# Patient Record
Sex: FEMALE | Race: WHITE | Employment: FULL TIME | URBAN - METROPOLITAN AREA
[De-identification: names, ages, dates, MRNs, and addresses within clinical notes are randomized per-mention and may not be internally consistent; named-entity substitution may affect disease eponyms.]

---

## 2019-07-17 ENCOUNTER — APPOINTMENT (OUTPATIENT)
Dept: CT IMAGING | Facility: CLINIC | Age: 49
DRG: 175 | End: 2019-07-17
Attending: EMERGENCY MEDICINE
Payer: COMMERCIAL

## 2019-07-17 ENCOUNTER — HOSPITAL ENCOUNTER (INPATIENT)
Facility: CLINIC | Age: 49
LOS: 1 days | Discharge: HOME | DRG: 175 | End: 2019-07-19
Attending: FAMILY MEDICINE | Admitting: FAMILY MEDICINE
Payer: COMMERCIAL

## 2019-07-17 ENCOUNTER — APPOINTMENT (OUTPATIENT)
Dept: GENERAL RADIOLOGY | Facility: CLINIC | Age: 49
DRG: 175 | End: 2019-07-17
Attending: EMERGENCY MEDICINE
Payer: COMMERCIAL

## 2019-07-17 DIAGNOSIS — R79.89 ELEVATED BRAIN NATRIURETIC PEPTIDE (BNP) LEVEL: ICD-10-CM

## 2019-07-17 DIAGNOSIS — I26.09 OTHER ACUTE PULMONARY EMBOLISM WITH ACUTE COR PULMONALE (H): Primary | ICD-10-CM

## 2019-07-17 DIAGNOSIS — R79.89 ELEVATED TROPONIN: ICD-10-CM

## 2019-07-17 DIAGNOSIS — I26.99 BILATERAL PULMONARY EMBOLISM (H): ICD-10-CM

## 2019-07-17 DIAGNOSIS — R06.09 DOE (DYSPNEA ON EXERTION): ICD-10-CM

## 2019-07-17 LAB
ALBUMIN SERPL-MCNC: 4 G/DL (ref 3.4–5)
ALP SERPL-CCNC: 146 U/L (ref 40–150)
ALT SERPL W P-5'-P-CCNC: 75 U/L (ref 0–50)
ANION GAP SERPL CALCULATED.3IONS-SCNC: 8 MMOL/L (ref 3–14)
AST SERPL W P-5'-P-CCNC: 123 U/L (ref 0–45)
BASOPHILS # BLD AUTO: 0.1 10E9/L (ref 0–0.2)
BASOPHILS NFR BLD AUTO: 0.9 %
BILIRUB SERPL-MCNC: 0.3 MG/DL (ref 0.2–1.3)
BUN SERPL-MCNC: 20 MG/DL (ref 7–30)
CALCIUM SERPL-MCNC: 9.6 MG/DL (ref 8.5–10.1)
CHLORIDE SERPL-SCNC: 108 MMOL/L (ref 94–109)
CO2 SERPL-SCNC: 24 MMOL/L (ref 20–32)
CREAT SERPL-MCNC: 0.76 MG/DL (ref 0.52–1.04)
D DIMER PPP FEU-MCNC: 3.4 UG/ML FEU (ref 0–0.5)
DIFFERENTIAL METHOD BLD: ABNORMAL
EOSINOPHIL # BLD AUTO: 0.1 10E9/L (ref 0–0.7)
EOSINOPHIL NFR BLD AUTO: 1.1 %
ERYTHROCYTE [DISTWIDTH] IN BLOOD BY AUTOMATED COUNT: 13.6 % (ref 10–15)
GFR SERPL CREATININE-BSD FRML MDRD: >90 ML/MIN/{1.73_M2}
GLUCOSE SERPL-MCNC: 106 MG/DL (ref 70–99)
HCT VFR BLD AUTO: 43.9 % (ref 35–47)
HGB BLD-MCNC: 14.2 G/DL (ref 11.7–15.7)
IMM GRANULOCYTES # BLD: 0 10E9/L (ref 0–0.4)
IMM GRANULOCYTES NFR BLD: 0.3 %
INR PPP: 1.02 (ref 0.86–1.14)
LYMPHOCYTES # BLD AUTO: 3.7 10E9/L (ref 0.8–5.3)
LYMPHOCYTES NFR BLD AUTO: 28.5 %
MCH RBC QN AUTO: 30.9 PG (ref 26.5–33)
MCHC RBC AUTO-ENTMCNC: 32.3 G/DL (ref 31.5–36.5)
MCV RBC AUTO: 95 FL (ref 78–100)
MONOCYTES # BLD AUTO: 0.8 10E9/L (ref 0–1.3)
MONOCYTES NFR BLD AUTO: 6.5 %
NEUTROPHILS # BLD AUTO: 8 10E9/L (ref 1.6–8.3)
NEUTROPHILS NFR BLD AUTO: 62.7 %
NRBC # BLD AUTO: 0 10*3/UL
NRBC BLD AUTO-RTO: 0 /100
NT-PROBNP SERPL-MCNC: 5749 PG/ML (ref 0–450)
PLATELET # BLD AUTO: 280 10E9/L (ref 150–450)
POTASSIUM SERPL-SCNC: 4.7 MMOL/L (ref 3.4–5.3)
PROT SERPL-MCNC: 8.2 G/DL (ref 6.8–8.8)
RADIOLOGIST FLAGS: ABNORMAL
RBC # BLD AUTO: 4.6 10E12/L (ref 3.8–5.2)
SODIUM SERPL-SCNC: 139 MMOL/L (ref 133–144)
TROPONIN I SERPL-MCNC: 0.08 UG/L (ref 0–0.04)
WBC # BLD AUTO: 12.8 10E9/L (ref 4–11)

## 2019-07-17 PROCEDURE — 99285 EMERGENCY DEPT VISIT HI MDM: CPT | Mod: 25 | Performed by: FAMILY MEDICINE

## 2019-07-17 PROCEDURE — 25000128 H RX IP 250 OP 636: Performed by: EMERGENCY MEDICINE

## 2019-07-17 PROCEDURE — 96375 TX/PRO/DX INJ NEW DRUG ADDON: CPT | Performed by: FAMILY MEDICINE

## 2019-07-17 PROCEDURE — 71046 X-RAY EXAM CHEST 2 VIEWS: CPT

## 2019-07-17 PROCEDURE — 25000128 H RX IP 250 OP 636: Performed by: FAMILY MEDICINE

## 2019-07-17 PROCEDURE — 85025 COMPLETE CBC W/AUTO DIFF WBC: CPT | Performed by: EMERGENCY MEDICINE

## 2019-07-17 PROCEDURE — 85610 PROTHROMBIN TIME: CPT | Performed by: EMERGENCY MEDICINE

## 2019-07-17 PROCEDURE — 80053 COMPREHEN METABOLIC PANEL: CPT | Performed by: EMERGENCY MEDICINE

## 2019-07-17 PROCEDURE — 93010 ELECTROCARDIOGRAM REPORT: CPT | Mod: Z6 | Performed by: FAMILY MEDICINE

## 2019-07-17 PROCEDURE — 84484 ASSAY OF TROPONIN QUANT: CPT | Performed by: EMERGENCY MEDICINE

## 2019-07-17 PROCEDURE — 93005 ELECTROCARDIOGRAM TRACING: CPT | Performed by: FAMILY MEDICINE

## 2019-07-17 PROCEDURE — 83880 ASSAY OF NATRIURETIC PEPTIDE: CPT | Performed by: EMERGENCY MEDICINE

## 2019-07-17 PROCEDURE — 99221 1ST HOSP IP/OBS SF/LOW 40: CPT | Mod: 25 | Performed by: INTERNAL MEDICINE

## 2019-07-17 PROCEDURE — 96366 THER/PROPH/DIAG IV INF ADDON: CPT | Performed by: FAMILY MEDICINE

## 2019-07-17 PROCEDURE — 96365 THER/PROPH/DIAG IV INF INIT: CPT | Performed by: FAMILY MEDICINE

## 2019-07-17 PROCEDURE — 85379 FIBRIN DEGRADATION QUANT: CPT | Performed by: EMERGENCY MEDICINE

## 2019-07-17 PROCEDURE — 71260 CT THORAX DX C+: CPT

## 2019-07-17 RX ORDER — IOPAMIDOL 755 MG/ML
53 INJECTION, SOLUTION INTRAVASCULAR ONCE
Status: COMPLETED | OUTPATIENT
Start: 2019-07-17 | End: 2019-07-17

## 2019-07-17 RX ORDER — NAPROXEN 250 MG/1
250 TABLET ORAL 2 TIMES DAILY WITH MEALS
Status: ON HOLD | COMMUNITY
End: 2019-07-18

## 2019-07-17 RX ORDER — HEPARIN SODIUM 10000 [USP'U]/100ML
0-3500 INJECTION, SOLUTION INTRAVENOUS CONTINUOUS
Status: DISCONTINUED | OUTPATIENT
Start: 2019-07-17 | End: 2019-07-19

## 2019-07-17 RX ORDER — OMEPRAZOLE 20 MG/1
40 TABLET, DELAYED RELEASE ORAL DAILY
Status: ON HOLD | COMMUNITY
End: 2019-07-18

## 2019-07-17 RX ORDER — CALCIUM/MAGNESIUM/ZINC 333-133 MG
175 TABLET ORAL
Status: ON HOLD | COMMUNITY
End: 2019-07-18

## 2019-07-17 RX ORDER — LEVOTHYROXINE SODIUM 75 UG/1
75 TABLET ORAL DAILY
COMMUNITY

## 2019-07-17 RX ORDER — PYRIDOXINE HCL (VITAMIN B6) 25 MG
25 TABLET ORAL DAILY
COMMUNITY

## 2019-07-17 RX ADMIN — HEPARIN SODIUM 1100 UNITS/HR: 10000 INJECTION, SOLUTION INTRAVENOUS at 23:15

## 2019-07-17 RX ADMIN — IOPAMIDOL 53 ML: 755 INJECTION, SOLUTION INTRAVENOUS at 21:20

## 2019-07-17 ASSESSMENT — ENCOUNTER SYMPTOMS
DIFFICULTY URINATING: 0
SHORTNESS OF BREATH: 1
ARTHRALGIAS: 0
EYE REDNESS: 0
WHEEZING: 0
CONFUSION: 0
FEVER: 0
HEADACHES: 0
COLOR CHANGE: 0
NECK STIFFNESS: 0
ABDOMINAL PAIN: 0

## 2019-07-17 ASSESSMENT — MIFFLIN-ST. JEOR: SCORE: 1200.14

## 2019-07-18 ENCOUNTER — APPOINTMENT (OUTPATIENT)
Dept: ULTRASOUND IMAGING | Facility: CLINIC | Age: 49
DRG: 175 | End: 2019-07-18
Attending: FAMILY MEDICINE
Payer: COMMERCIAL

## 2019-07-18 ENCOUNTER — APPOINTMENT (OUTPATIENT)
Dept: CARDIOLOGY | Facility: CLINIC | Age: 49
DRG: 175 | End: 2019-07-18
Payer: COMMERCIAL

## 2019-07-18 PROBLEM — Z85.71 HISTORY OF HODGKIN'S LYMPHOMA: Status: ACTIVE | Noted: 2019-07-18

## 2019-07-18 PROBLEM — K21.00 GASTROESOPHAGEAL REFLUX DISEASE WITH ESOPHAGITIS: Status: ACTIVE | Noted: 2019-07-18

## 2019-07-18 PROBLEM — E03.9 HYPOTHYROIDISM: Status: ACTIVE | Noted: 2019-07-18

## 2019-07-18 PROBLEM — I26.99 PULMONARY EMBOLISM (H): Status: ACTIVE | Noted: 2019-07-18

## 2019-07-18 PROBLEM — K21.9 GASTROESOPHAGEAL REFLUX DISEASE WITHOUT ESOPHAGITIS: Status: ACTIVE | Noted: 2019-07-18

## 2019-07-18 LAB
ALBUMIN SERPL-MCNC: 3.5 G/DL (ref 3.4–5)
ALP SERPL-CCNC: 140 U/L (ref 40–150)
ALT SERPL W P-5'-P-CCNC: 73 U/L (ref 0–50)
ANION GAP SERPL CALCULATED.3IONS-SCNC: 8 MMOL/L (ref 3–14)
AST SERPL W P-5'-P-CCNC: 100 U/L (ref 0–45)
BASOPHILS # BLD AUTO: 0.1 10E9/L (ref 0–0.2)
BASOPHILS NFR BLD AUTO: 1.6 %
BILIRUB SERPL-MCNC: 0.4 MG/DL (ref 0.2–1.3)
BUN SERPL-MCNC: 17 MG/DL (ref 7–30)
CALCIUM SERPL-MCNC: 8.8 MG/DL (ref 8.5–10.1)
CHLORIDE SERPL-SCNC: 109 MMOL/L (ref 94–109)
CO2 SERPL-SCNC: 21 MMOL/L (ref 20–32)
CREAT SERPL-MCNC: 0.74 MG/DL (ref 0.52–1.04)
DIFFERENTIAL METHOD BLD: NORMAL
EOSINOPHIL # BLD AUTO: 0.1 10E9/L (ref 0–0.7)
EOSINOPHIL NFR BLD AUTO: 1.3 %
ERYTHROCYTE [DISTWIDTH] IN BLOOD BY AUTOMATED COUNT: 13.7 % (ref 10–15)
GFR SERPL CREATININE-BSD FRML MDRD: >90 ML/MIN/{1.73_M2}
GLUCOSE SERPL-MCNC: 100 MG/DL (ref 70–99)
HCT VFR BLD AUTO: 40.1 % (ref 35–47)
HGB BLD-MCNC: 12.8 G/DL (ref 11.7–15.7)
IMM GRANULOCYTES # BLD: 0 10E9/L (ref 0–0.4)
IMM GRANULOCYTES NFR BLD: 0.4 %
INTERPRETATION ECG - MUSE: NORMAL
INTERPRETATION ECG - MUSE: NORMAL
LACTATE BLD-SCNC: 1 MMOL/L (ref 0.7–2)
LACTATE BLD-SCNC: NORMAL MMOL/L (ref 0.7–2)
LMWH PPP CHRO-ACNC: 0.75 IU/ML
LMWH PPP CHRO-ACNC: 0.91 IU/ML
LMWH PPP CHRO-ACNC: 1.44 IU/ML
LYMPHOCYTES # BLD AUTO: 1.7 10E9/L (ref 0.8–5.3)
LYMPHOCYTES NFR BLD AUTO: 19 %
MCH RBC QN AUTO: 30.5 PG (ref 26.5–33)
MCHC RBC AUTO-ENTMCNC: 31.9 G/DL (ref 31.5–36.5)
MCV RBC AUTO: 96 FL (ref 78–100)
MONOCYTES # BLD AUTO: 0.6 10E9/L (ref 0–1.3)
MONOCYTES NFR BLD AUTO: 6.6 %
NEUTROPHILS # BLD AUTO: 6.3 10E9/L (ref 1.6–8.3)
NEUTROPHILS NFR BLD AUTO: 71.1 %
NRBC # BLD AUTO: 0 10*3/UL
NRBC BLD AUTO-RTO: 0 /100
PLATELET # BLD AUTO: 274 10E9/L (ref 150–450)
POTASSIUM SERPL-SCNC: 4.2 MMOL/L (ref 3.4–5.3)
PROT SERPL-MCNC: 7 G/DL (ref 6.8–8.8)
RBC # BLD AUTO: 4.19 10E12/L (ref 3.8–5.2)
SODIUM SERPL-SCNC: 139 MMOL/L (ref 133–144)
TROPONIN I SERPL-MCNC: 0.07 UG/L (ref 0–0.04)
WBC # BLD AUTO: 8.9 10E9/L (ref 4–11)

## 2019-07-18 PROCEDURE — 84484 ASSAY OF TROPONIN QUANT: CPT | Performed by: FAMILY MEDICINE

## 2019-07-18 PROCEDURE — 93970 EXTREMITY STUDY: CPT

## 2019-07-18 PROCEDURE — 93308 TTE F-UP OR LMTD: CPT | Mod: 26 | Performed by: INTERNAL MEDICINE

## 2019-07-18 PROCEDURE — 93010 ELECTROCARDIOGRAM REPORT: CPT | Performed by: INTERNAL MEDICINE

## 2019-07-18 PROCEDURE — 93308 TTE F-UP OR LMTD: CPT

## 2019-07-18 PROCEDURE — 36415 COLL VENOUS BLD VENIPUNCTURE: CPT | Performed by: FAMILY MEDICINE

## 2019-07-18 PROCEDURE — 25000132 ZZH RX MED GY IP 250 OP 250 PS 637: Performed by: FAMILY MEDICINE

## 2019-07-18 PROCEDURE — 93325 DOPPLER ECHO COLOR FLOW MAPG: CPT | Mod: 26 | Performed by: INTERNAL MEDICINE

## 2019-07-18 PROCEDURE — 93321 DOPPLER ECHO F-UP/LMTD STD: CPT | Mod: 26 | Performed by: INTERNAL MEDICINE

## 2019-07-18 PROCEDURE — 80053 COMPREHEN METABOLIC PANEL: CPT | Performed by: STUDENT IN AN ORGANIZED HEALTH CARE EDUCATION/TRAINING PROGRAM

## 2019-07-18 PROCEDURE — 12000001 ZZH R&B MED SURG/OB UMMC

## 2019-07-18 PROCEDURE — 85520 HEPARIN ASSAY: CPT | Performed by: FAMILY MEDICINE

## 2019-07-18 PROCEDURE — 80053 COMPREHEN METABOLIC PANEL: CPT | Performed by: FAMILY MEDICINE

## 2019-07-18 PROCEDURE — 83605 ASSAY OF LACTIC ACID: CPT

## 2019-07-18 PROCEDURE — 25000128 H RX IP 250 OP 636: Performed by: FAMILY MEDICINE

## 2019-07-18 PROCEDURE — 85025 COMPLETE CBC W/AUTO DIFF WBC: CPT | Performed by: STUDENT IN AN ORGANIZED HEALTH CARE EDUCATION/TRAINING PROGRAM

## 2019-07-18 RX ORDER — LIDOCAINE 40 MG/G
CREAM TOPICAL
Status: DISCONTINUED | OUTPATIENT
Start: 2019-07-18 | End: 2019-07-19 | Stop reason: HOSPADM

## 2019-07-18 RX ORDER — NALOXONE HYDROCHLORIDE 0.4 MG/ML
.1-.4 INJECTION, SOLUTION INTRAMUSCULAR; INTRAVENOUS; SUBCUTANEOUS
Status: DISCONTINUED | OUTPATIENT
Start: 2019-07-18 | End: 2019-07-19 | Stop reason: HOSPADM

## 2019-07-18 RX ORDER — ONDANSETRON 2 MG/ML
4 INJECTION INTRAMUSCULAR; INTRAVENOUS EVERY 6 HOURS PRN
Status: DISCONTINUED | OUTPATIENT
Start: 2019-07-18 | End: 2019-07-19 | Stop reason: HOSPADM

## 2019-07-18 RX ORDER — ONDANSETRON 4 MG/1
4 TABLET, ORALLY DISINTEGRATING ORAL EVERY 6 HOURS PRN
Status: DISCONTINUED | OUTPATIENT
Start: 2019-07-18 | End: 2019-07-19 | Stop reason: HOSPADM

## 2019-07-18 RX ORDER — NAPROXEN 250 MG/1
250 TABLET ORAL DAILY
Status: ON HOLD | COMMUNITY
End: 2019-07-19

## 2019-07-18 RX ORDER — ACETAMINOPHEN 325 MG/1
650 TABLET ORAL EVERY 4 HOURS PRN
Status: DISCONTINUED | OUTPATIENT
Start: 2019-07-18 | End: 2019-07-19 | Stop reason: HOSPADM

## 2019-07-18 RX ORDER — CALCIUM/MAGNESIUM/ZINC 333-133 MG
TABLET ORAL DAILY
Status: ON HOLD | COMMUNITY
End: 2019-07-19

## 2019-07-18 RX ORDER — OMEPRAZOLE 40 MG/1
40 CAPSULE, DELAYED RELEASE ORAL DAILY
COMMUNITY

## 2019-07-18 RX ADMIN — LEVOTHYROXINE SODIUM 75 MCG: 0.05 TABLET ORAL at 08:21

## 2019-07-18 RX ADMIN — HEPARIN SODIUM 750 UNITS/HR: 10000 INJECTION, SOLUTION INTRAVENOUS at 22:22

## 2019-07-18 RX ADMIN — OMEPRAZOLE 40 MG: 20 CAPSULE, DELAYED RELEASE ORAL at 08:21

## 2019-07-18 RX ADMIN — HEPARIN SODIUM 850 UNITS/HR: 10000 INJECTION, SOLUTION INTRAVENOUS at 14:34

## 2019-07-18 ASSESSMENT — ENCOUNTER SYMPTOMS
BRUISES/BLEEDS EASILY: 0
NAUSEA: 0
DECREASED CONCENTRATION: 0
COUGH: 0
WEAKNESS: 0
RHINORRHEA: 1
FLANK PAIN: 0
PALPITATIONS: 0
VOMITING: 0
ACTIVITY CHANGE: 1
TROUBLE SWALLOWING: 0
FATIGUE: 1
BACK PAIN: 0
LIGHT-HEADEDNESS: 0
DYSPHORIC MOOD: 0

## 2019-07-18 ASSESSMENT — ACTIVITIES OF DAILY LIVING (ADL)
ADLS_ACUITY_SCORE: 13

## 2019-07-18 ASSESSMENT — MIFFLIN-ST. JEOR: SCORE: 1193.33

## 2019-07-18 NOTE — DISCHARGE SUMMARY
[facility], [place]  Discharge Summary - Medicine & Pediatrics       Date of Admission:  7/17/2019  Date of Discharge:  7/19/2019  Discharging Provider: Dr. Mir  Discharge Service: [facility]    Discharge Diagnoses   # Acute PE with acute cor pulmonale  # New Left BBB  # Hypothyroidism  # GERD  # Marcum's esophagus confirmed with endoscopy  # Hx of Hodgkin Lymphoma stage IIIa in remission s/p radiation    Follow-ups Needed After Discharge   Follow-up Appointments     Adult [facility] Follow-up and recommended labs and tests      Follow up with primary care provider, Misti Root, within 7 days for   hospital follow- up.  Consider coagulopathy testing, mammogram, EGD, early   cancer screening including colonoscopy, possible thyroid ultrasound. No   follow up labs or test are needed.    Follow up with cardiology, at [facility], within 1 month.     Appointments on [place] and/or [facility] (with [facility] or [facility]   provider or service). Call [phone] if you haven't heard regarding   these appointments within 7 days of discharge.          PCP  - Please ensure patient takes apixaban for 3 months [discharged with 1 month supply]  - Encourage patient to follow up with outpatient cardiology within 1 months  - Consider hypercoagulable work up and early cancer screening  - Remind patient to avoid NSAIDs    Unresulted Labs Ordered in the Past 30 Days of this Admission     No orders found from 6/17/2019 to 7/18/2019.      These results will be followed up by Dr. Mir    Discharge Disposition   Discharged to home  Condition at discharge: Good    Hospital Course   Puja Markham is a 48 year old female admitted on 7/17/2019. She has a history of Hodgkin Lymphoma s/p radiation at age 15, Cristopher's esophagus, GERD, & hypothyroidism and is admitted for pulmonary embolism. The following problems were addressed during her hospitalization:    # Acute PE with acute cor pulmonale  # New Left BBB  Patient with  several days of dyspnea with exertion in setting of recent long travel was found to have extensive bilateral pulmonary emboli on CT. Work up ruled out ACS, however, demonstrated new left BBB on EKG, elevated BNP and echo on 7/18 showed (1) RV mild-moderate dilation, (2) RV mild reduced function, (3) moderate PH, (4) elevated right side pressure, and (5) moderate AI. Negative LE Doppler. On admission hemodynamically stable but requiring 2L of O2; no intervention after being evaluated by cardiology and IR. Patient weaned off oxygen, vitally stable, & clinically improved at discharge. Managed with telemetry observation, heparin drip then transitioned to PO apixaban. Patient has no prior PE/DVT, no known familial coagulopathy, potential estrogen analog supplement use, and recent travel history, thus suspect provoked PE.  - Continue PO apixaban for 3 months [discharged with 1 month supply]  - Follow-up with outpatient cariology within 1 month; consider stress testing  - Discontinued Milk Thistle-Dand-Fennel-Licor 175mg PO due to estrogen analog  - Discontinued naproxen; use NSAID alternative like acetaminophen      # Hx of Hodgkin Lymphoma stage IIIa in remission since 1986 s/p radiation  Patient reports history of Hodgkin Lymphoma stage IIIa at age 15 that was treated with neck & abdominal radiation with splenectomy. Although PE likely provoked by stasis, malignancy & inherited coagulopathies worth considering.  - Outpatient cancer screening (mammogram [due 2019], EGD for Cristopher's [due 10/2019], colonoscopy [due 12/2019, however ASCRS recommends start age 45])  - Consider outpatient hypercoagulation work up after completing apixaban course     # GERD  # Marcum's esophagus confirmed with endoscopy  Managed with home omeprazole.  - Follow up EGD of Marcum's esophagus in 2019    # Hypothyroidism   Managed with home levothyroxine.    Consultations This Hospital Stay   CARDIOLOGY GENERAL ADULT IP CONSULT  MEDICATION  HISTORY IP PHARMACY CONSULT    Code Status   Full Code       The patient was discussed with Dr. Clarke Willett's Service  [facility] [place]  Pager: [phone]    I was present with the medical student who participated in the service and in the documentation of this note. I have verified the history and personally performed the physical exam and medical decision making, and have verified the content of the note, which accurately reflects my assessment of the patient and the plan of care.   Simran Gautam MD       ______________________________________________________________________    Physical Exam   Vital Signs: Temp: 98  F (36.7  C) Temp src: Oral BP: 122/65   Heart Rate: 90 Resp: 16 SpO2: 93 % O2 Device: None (Room air)  Weight: 132 lbs 1.6 oz  Constitutional: awake, alert, cooperative, no apparent distress  Respiratory: Breathing comfortably on room air, good air exchange, clear to auscultation bilaterally, no crackles or wheezing  Cardiovascular: RRR, normal S1 and S2, without any murmurs or extra heart sounds  GI: Soft, non-distended, non-tender, no masses palpated  Skin: no bruising or bleeding and no redness, warmth, or swelling  Musculoskeletal: no lower extremity pitting edema present; no tenderness of calves  Neuropsychiatric: General: normal, calm and normal eye contact  Level of consciousness: alert / normal  Affect: normal and pleasant        Primary Care Physician   Misti Root    Discharge Orders      Reason for your hospital stay    You were admitted for a blood clot in your lungs. You were treated with blood thinners and will keep taking them by mouth when you leave.     Adult [facility] Follow-up and recommended labs and tests    Follow up with primary care provider, Misti Root, within 7 days for hospital follow- up.  Consider coagulopathy testing, mammogram, EGD, early cancer screening including colonoscopy, possible thyroid ultrasound. No follow up  labs or test are needed.    Follow up with cardiology, at [facility], within 1 month.     Appointments on [place] and/or [facility] (with [facility] or [facility] provider or service). Call [phone] if you haven't heard regarding these appointments within 7 days of discharge.     Activity    Your activity upon discharge: activity as tolerated     Full Code     Diet    Follow this diet upon discharge: Regular       Significant Results and Procedures   Most Recent 3 CBC's:  Recent Labs   Lab Test 07/19/19  0450 07/18/19  1044 07/17/19 2033   WBC 9.2 8.9 12.8*   HGB 12.1 12.8 14.2   MCV 97 96 95    274 280     Most Recent 3 BMP's:  Recent Labs   Lab Test 07/19/19  0450 07/18/19  0604 07/17/19 2033    139 139   POTASSIUM 3.9 4.2 4.7   CHLORIDE 109 109 108   CO2 23 21 24   BUN 17 17 20   CR 0.70 0.74 0.76   ANIONGAP 8 8 8   JAMES 9.0 8.8 9.6   GLC 96 100* 106*     Most Recent INR's and Anticoagulation Dosing History:  Anticoagulation Dose History     Recent Dosing and Labs Latest Ref Rng & Units 7/17/2019    INR 0.86 - 1.14 1.02        Most Recent 3 Troponin's:  Recent Labs   Lab Test 07/18/19  0604 07/17/19 2033   TROPI 0.070* 0.082*     Most Recent 3 BNP's:  Recent Labs   Lab Test 07/17/19 2033   NTBNPI 5,749*     Most Recent D-dimer:  Recent Labs   Lab Test 07/17/19 2033   DD 3.4*     Most Recent TSH and T4:No lab results found.,   Results for orders placed or performed during the hospital encounter of 07/17/19   XR Chest 2 Views    Narrative    EXAM: XR CHEST 2 VW  7/17/2019 9:53 PM      HISTORY: shortness of breath    COMPARISON: CT chest 7/17/2019    FINDINGS: PA and lateral chest. The trachea is midline. The cardiac  silhouette is within normal limits. No pleural effusion or  pneumothorax. Upper abdomen is unremarkable. Degenerative changes of  the spine.       Impression    IMPRESSION: No acute airspace opacities.    I have personally reviewed the examination and initial interpretation  and I agree with  the findings.    FADUMO SANTOS MD   CT Chest Pulmonary Embolism w Contrast     Value    Radiologist flags Acute pulmonary emboli (Urgent)    Narrative    Examination:  CT CHEST PULMONARY EMBOLISM W CONTRAST 7/17/2019 9:31 PM      Comparison: None available.    PROVIDED HISTORY: PE suspected, high pretest probability    TECHNIQUE: Volumetric helical acquisition of CT images of the chest  from the lung apices to the kidneys were acquired in arterial phase  after the administration of IV contrast. Three-dimensional (3D)  post-processed angiographic images were reconstructed, archived to  PACS and used in the interpretation of this study. Contrast dose:  iopamidol (ISOVUE-370) solution 53 mL    FINDINGS:  VASCULATURE: There is adequate opacification of the main and lobar  pulmonary arteries. Acute pulmonary emboli in the distal right  pulmonary artery extending into the right upper, middle, and lower  lobar and segmental arteries, as well as the distal left main  pulmonary artery descending into the left upper and lower lobar and  segmental arteries. No right heart strain. Heart size is within normal  limits. Normal caliber of the thoracic aorta and main pulmonary  artery. No pericardial effusion.    LUNGS: The central tracheobronchial tree is patent. No pleural  effusion or pneumothorax. Right upper lobe paramediastinal  architectural distortion with slight bronchiectasis and reticulation  suggesting atelectasis. Mild dependent atelectasis bilaterally.  Vaguely triangular 1.2 x 1.4 cm nodular opacity in the left  costophrenic sulcus (series 9, image 81).    MEDIASTINUM: No enlarged mediastinal or axillary lymph nodes. Thyroid  is unremarkable. Mildly patulous esophagus.    UPPER ABDOMEN: Incidental low-density left renal cortical cyst.    BONES/SOFT TISSUES: No aggressive osseous lesions.      Impression    IMPRESSION:   1. Acute extensive pulmonary emboli extending the from the distal  right and left pulmonary  arteries into all the lobar arteries and  multiple segmental branches.  2. No CT evidence of right heart strain.  3. Subtly triangular nodule in the left costophrenic sulcus, possibly  pulmonary infarction. Consider short term follow-up.    [Urgent Result: Acute pulmonary emboli]    Finding was identified on 7/17/2019 9:55 PM.     Dr. Arevalo was contacted by Dr. Dubon at 7/17/2019 10:08 PM and  verbalized understanding of the urgent finding.     I have personally reviewed the examination and initial interpretation  and I agree with the findings.    FADUMO SANTOS MD   US Lower Extremity Venous Duplex Bilateral    Narrative    EXAMINATION: DOPPLER VENOUS ULTRASOUND OF BILATERAL LOWER EXTREMITIES,  7/18/2019 1:03 AM     COMPARISON: None available    HISTORY: Evaluation for DVT     TECHNIQUE:  Gray-scale evaluation with compression, spectral flow and  color Doppler assessment of the deep venous system of both legs from  groin to knee, and then at the ankles.    FINDINGS:  In both lower extremities, the common femoral, femoral, popliteal and  posterior tibial veins demonstrate normal compressibility and blood  flow.      Impression    IMPRESSION:  No evidence of deep venous thrombosis in either lower extremity.    I have personally reviewed the examination and initial interpretation  and I agree with the findings.    ILIANA ARAYA MD       Discharge Medications   Current Discharge Medication List      START taking these medications    Details   apixaban ANTICOAGULANT (ELIQUIS STARTER PACK) 5 MG tablet Take 2 tablets (10 mg) by mouth 2 times daily for 7 days, THEN 1 tablet (5 mg) 2 times daily for 23 days.  Qty: 74 tablet, Refills: 0    Associated Diagnoses: Other acute pulmonary embolism with acute cor pulmonale (H)         CONTINUE these medications which have NOT CHANGED    Details   levothyroxine (SYNTHROID/LEVOTHROID) 75 MCG tablet Take 75 mcg by mouth daily      omeprazole (PRILOSEC) 40 MG DR capsule Take  40 mg by mouth daily      vitamin B6 (VITAMIN  B-6) 25 MG tablet Take 25 mg by mouth daily         STOP taking these medications       Milk Thistle-Dand-Fennel-Licor (MILK THISTLE XTRA) CAPS capsule Comments:   Reason for Stopping:         Milk Thistle-Dand-Fennel-Licor (MILK THISTLE XTRA) CAPS capsule Comments:   Reason for Stopping:         naproxen (NAPROSYN) 250 MG tablet Comments:   Reason for Stopping:         naproxen (NAPROSYN) 250 MG tablet Comments:   Reason for Stopping:         omeprazole (PRILOSEC OTC) 20 MG EC tablet Comments:   Reason for Stopping:             Allergies   No Known Allergies       Attestation:  This patient has been seen and evaluated by me, Karol Mir on 7/19/2019.  I saw and discussed the case with the primary resident and the care team. I agree with the findings and plan in this note. I have reviewed today's vital signs, medications, laboratory results and imaging results, EKG, telemetry.   [facility]

## 2019-07-18 NOTE — ED PROVIDER NOTES
History     Chief Complaint   Patient presents with     Shortness of Breath     HPI  Puja Markham is a 48 year old female with a history of GERD and Hodgkin's lymphoma who presents to the Emergency Department today for evaluation of shortness of breath. The patient states that she has been having increased shortness of breath the past few days. The patient states that she has been having shortness of breath with activity. She notes that she does not have shortness of breath at rest. The patient denies having any chest pain, wheezing. The patient reports that she has had issues with shortness of breath in the past that she was told was secondary to her history of GERD. The patient notes that this shortness of breath is not similar.  Patient notes that she traveled to [place] 6 hours in a car over 4 July and then returned.  Denies any leg pain or leg swelling no family history of clotting disorder otherwise.  No fevers or chills no hemoptysis.  At rest she feels okay no history of syncope.    I have reviewed the Medications, Allergies, Past Medical and Surgical History, and Social History in the [employer] system.    Past Medical History:   Diagnosis Date     Hodgkin disease (H) 1986     Seizures (H)     d/t epidrual.      Past Surgical History:   Procedure Laterality Date     ENDOMETRIAL CRYOABLATION W/ ULTRASONIC GUIDANCE  2011     HYSTERECTOMY RADICAL  2017     LASIK  04/2017     History reviewed. No pertinent family history.    Social History     Tobacco Use     Smoking status: Current Some Day Smoker     Smokeless tobacco: Never Used     Tobacco comment: One pack in 1.5 months   Substance Use Topics     Alcohol use: Yes     Comment: on the weekends.      Current Facility-Administered Medications   Medication     heparin  drip 25,000 units in 0.45% NaCl 250 mL (see additional administration details for dose)     heparin bolus from infusion pump      No Known Allergies     Review of Systems   Constitutional:  "Positive for activity change and fatigue. Negative for fever.        Increasing shortness of breath with activity.   HENT: Positive for rhinorrhea. Negative for congestion, nosebleeds and trouble swallowing.    Eyes: Negative for redness and visual disturbance.   Respiratory: Positive for shortness of breath. Negative for cough and wheezing.    Cardiovascular: Negative for chest pain, palpitations and leg swelling.   Gastrointestinal: Negative for abdominal pain, nausea and vomiting.   Genitourinary: Negative for difficulty urinating and flank pain.   Musculoskeletal: Negative for arthralgias, back pain, gait problem and neck stiffness.   Skin: Negative for color change.   Allergic/Immunologic: Negative for immunocompromised state.   Neurological: Negative for syncope, weakness, light-headedness and headaches.   Hematological: Does not bruise/bleed easily.   Psychiatric/Behavioral: Negative for confusion, decreased concentration and dysphoric mood.   All other systems reviewed and are negative.    Physical Exam   BP: 154/83  Heart Rate: 100  Temp: 98.3  F (36.8  C)  Resp: 20  Height: 160 cm (5' 3\")  Weight: 60.6 kg (133 lb 9.6 oz)  SpO2: 94 %    Physical Exam   Constitutional: She is oriented to person, place, and time. She appears well-developed and well-nourished. She appears distressed.   In the ER patient mildly anxious but otherwise appropriate here.  Is not markedly dyspneic with speech here with her son.   HENT:   Head: Normocephalic and atraumatic.   Eyes: Pupils are equal, round, and reactive to light. Conjunctivae and EOM are normal. No scleral icterus.   Neck: Normal range of motion. Neck supple. No JVD present. No tracheal deviation present.   Cardiovascular:   Mild sinus tachycardia slightly over 100   Pulmonary/Chest: No stridor. No respiratory distress. She has no wheezes. She has no rales.   Abdominal: She exhibits no distension. There is no tenderness. There is no guarding.   Musculoskeletal: She " exhibits no edema, tenderness or deformity.   Negative Homans sign   Neurological: She is alert and oriented to person, place, and time.   Skin: Skin is warm and dry. Capillary refill takes less than 2 seconds. No rash noted. She is not diaphoretic. No erythema. No pallor.   Psychiatric:   Affect is appropriate here in the ER   Nursing note and vitals reviewed.      ED Course        Procedures         Patient quickly evaluated here in the ER.  Initially patient had an EKG done revealing sinus tachycardia.  Patient laboratory testing done.  White count was 12.8.  D-dimer noted to be elevated 3.4.  Troponin slightly elevated 0.082.  BNP was 5749.  ALT 75 AST is 123.  Creatinine noted to be 0.76.  Glucose 106.  INR 1.02.    CT scan done of the chest PE protocol revealed extensive pulmonary emboli throughout all lobar and segmental pulmonary arteries.  No visible signs of heart strain seen on CT there is some question of a left lower lung infarct also.    Discussed findings with the patient.  I consulted the PERT.  Discussed with her that radiology also cardiology fellow did come down and see the patient here in the ER.  Echo was done revealing no significant right heart strain at this point.  This was discussed currently with the cardiology staff who is on for PERT.  Recommendations at this point we initiated heparin right away here in the ER high intensity with bolus.  Patient on oxygen.  Will be admitted to medicine service I did talk to them also I did order bilateral lower extremity Dopplers also.  Discussed at length with patient regarding findings etc. she is stable.here in the ER and was then admitted to a medical telemetry bed.                 EKG Interpretation:      Interpreted by Earl Arevalo  Time reviewed: 2040  Symptoms at time of EKG: sob   Rhythm: sinus tach  Rate: normal  Axis: left  Ectopy: none  Conduction: nonspec ivcd  ST Segments/ T Waves: Nonspecific ST-T wave changes  Q Waves:  none  Comparison to prior: No old EKG available    Clinical Impression: normal EKG          Critical Care time:  was 30 minutes for this patient excluding procedures.             Labs Ordered and Resulted from Time of ED Arrival Up to the Time of Departure from the ED   CBC WITH PLATELETS DIFFERENTIAL - Abnormal; Notable for the following components:       Result Value    WBC 12.8 (*)     All other components within normal limits   D DIMER QUANTITATIVE - Abnormal; Notable for the following components:    D Dimer 3.4 (*)     All other components within normal limits   COMPREHENSIVE METABOLIC PANEL - Abnormal; Notable for the following components:    Glucose 106 (*)     ALT 75 (*)      (*)     All other components within normal limits   TROPONIN I - Abnormal; Notable for the following components:    Troponin I ES 0.082 (*)     All other components within normal limits   NT PROBNP INPATIENT - Abnormal; Notable for the following components:    N-Terminal Pro BNP Inpatient 5,749 (*)     All other components within normal limits   INR   NOTIFY PHYSICIAN   NOTIFY PHYSICIAN   PLATELETS MONITORED PER HEPARIN TREATMENT PROTOCOL (FOR MEANINGFUL USE     Results for orders placed or performed during the hospital encounter of 07/17/19   XR Chest 2 Views    Narrative    EXAM: XR CHEST 2 VW  7/17/2019 9:53 PM      HISTORY: shortness of breath    COMPARISON: CT chest 7/17/2019    FINDINGS: PA and lateral chest. The trachea is midline. The cardiac  silhouette is within normal limits. No pleural effusion or  pneumothorax. Upper abdomen is unremarkable. Degenerative changes of  the spine.       Impression    IMPRESSION: No acute airspace opacities.    I have personally reviewed the examination and initial interpretation  and I agree with the findings.    FADUMO SANTOS MD   CT Chest Pulmonary Embolism w Contrast   Result Value Ref Range    Radiologist flags Acute pulmonary emboli (Urgent)     Narrative    Examination:  CT  CHEST PULMONARY EMBOLISM W CONTRAST 7/17/2019 9:31 PM      Comparison: None available.    PROVIDED HISTORY: PE suspected, high pretest probability    TECHNIQUE: Volumetric helical acquisition of CT images of the chest  from the lung apices to the kidneys were acquired in arterial phase  after the administration of IV contrast. Three-dimensional (3D)  post-processed angiographic images were reconstructed, archived to  PACS and used in the interpretation of this study. Contrast dose:  iopamidol (ISOVUE-370) solution 53 mL    FINDINGS:  VASCULATURE: There is adequate opacification of the main and lobar  pulmonary arteries. Acute pulmonary emboli in the distal right  pulmonary artery extending into the right upper, middle, and lower  lobar and segmental arteries, as well as the distal left main  pulmonary artery descending into the left upper and lower lobar and  segmental arteries. No right heart strain. Heart size is within normal  limits. Normal caliber of the thoracic aorta and main pulmonary  artery. No pericardial effusion.    LUNGS: The central tracheobronchial tree is patent. No pleural  effusion or pneumothorax. Right upper lobe paramediastinal  architectural distortion with slight bronchiectasis and reticulation  suggesting atelectasis. Mild dependent atelectasis bilaterally.  Vaguely triangular 1.2 x 1.4 cm nodular opacity in the left  costophrenic sulcus (series 9, image 81).    MEDIASTINUM: No enlarged mediastinal or axillary lymph nodes. Thyroid  is unremarkable. Mildly patulous esophagus.    UPPER ABDOMEN: Incidental low-density left renal cortical cyst.    BONES/SOFT TISSUES: No aggressive osseous lesions.      Impression    IMPRESSION:   1. Acute extensive pulmonary emboli extending the from the distal  right and left pulmonary arteries into all the lobar arteries and  multiple segmental branches.  2. No CT evidence of right heart strain.  3. Subtly triangular nodule in the left costophrenic sulcus,  possibly  pulmonary infarction. Consider short term follow-up.    [Urgent Result: Acute pulmonary emboli]    Finding was identified on 7/17/2019 9:55 PM.     Dr. Arevalo was contacted by Dr. Dubon at 7/17/2019 10:08 PM and  verbalized understanding of the urgent finding.     I have personally reviewed the examination and initial interpretation  and I agree with the findings.    FADUMO SANTOS MD   US Lower Extremity Venous Duplex Bilateral    Narrative    EXAMINATION: DOPPLER VENOUS ULTRASOUND OF BILATERAL LOWER EXTREMITIES,  7/18/2019 1:03 AM     COMPARISON: None available    HISTORY: Evaluation for DVT     TECHNIQUE:  Gray-scale evaluation with compression, spectral flow and  color Doppler assessment of the deep venous system of both legs from  groin to knee, and then at the ankles.    FINDINGS:  In both lower extremities, the common femoral, femoral, popliteal and  posterior tibial veins demonstrate normal compressibility and blood  flow.      Impression    IMPRESSION:  No evidence of deep venous thrombosis in either lower extremity.   CBC with platelets differential   Result Value Ref Range    WBC 12.8 (H) 4.0 - 11.0 10e9/L    RBC Count 4.60 3.8 - 5.2 10e12/L    Hemoglobin 14.2 11.7 - 15.7 g/dL    Hematocrit 43.9 35.0 - 47.0 %    MCV 95 78 - 100 fl    MCH 30.9 26.5 - 33.0 pg    MCHC 32.3 31.5 - 36.5 g/dL    RDW 13.6 10.0 - 15.0 %    Platelet Count 280 150 - 450 10e9/L    Diff Method Automated Method     % Neutrophils 62.7 %    % Lymphocytes 28.5 %    % Monocytes 6.5 %    % Eosinophils 1.1 %    % Basophils 0.9 %    % Immature Granulocytes 0.3 %    Nucleated RBCs 0 0 /100    Absolute Neutrophil 8.0 1.6 - 8.3 10e9/L    Absolute Lymphocytes 3.7 0.8 - 5.3 10e9/L    Absolute Monocytes 0.8 0.0 - 1.3 10e9/L    Absolute Eosinophils 0.1 0.0 - 0.7 10e9/L    Absolute Basophils 0.1 0.0 - 0.2 10e9/L    Abs Immature Granulocytes 0.0 0 - 0.4 10e9/L    Absolute Nucleated RBC 0.0    D dimer quantitative   Result Value  Ref Range    D Dimer 3.4 (H) 0.0 - 0.50 ug/ml FEU   INR   Result Value Ref Range    INR 1.02 0.86 - 1.14   Comprehensive metabolic panel   Result Value Ref Range    Sodium 139 133 - 144 mmol/L    Potassium 4.7 3.4 - 5.3 mmol/L    Chloride 108 94 - 109 mmol/L    Carbon Dioxide 24 20 - 32 mmol/L    Anion Gap 8 3 - 14 mmol/L    Glucose 106 (H) 70 - 99 mg/dL    Urea Nitrogen 20 7 - 30 mg/dL    Creatinine 0.76 0.52 - 1.04 mg/dL    GFR Estimate >90 >60 mL/min/[1.73_m2]    GFR Estimate If Black >90 >60 mL/min/[1.73_m2]    Calcium 9.6 8.5 - 10.1 mg/dL    Bilirubin Total 0.3 0.2 - 1.3 mg/dL    Albumin 4.0 3.4 - 5.0 g/dL    Protein Total 8.2 6.8 - 8.8 g/dL    Alkaline Phosphatase 146 40 - 150 U/L    ALT 75 (H) 0 - 50 U/L     (H) 0 - 45 U/L   Troponin I   Result Value Ref Range    Troponin I ES 0.082 (H) 0.000 - 0.045 ug/L   Nt probnp inpatient (BNP)   Result Value Ref Range    N-Terminal Pro BNP Inpatient 5,749 (H) 0 - 450 pg/mL   EKG 12-lead, tracing only   Result Value Ref Range    Interpretation ECG Click View Image link to view waveform and result    Cardiology General Adult IP Consult: Patient to be seen: Routine within 24 hrs; Call back #: [id number]; PE; Consultant may enter orders: Yes; Requesting provider? Hospitalist (if different from attending physician)    Narrative    Abram Rodríguez MD     7/17/2019 11:47 PM  CARDIOLOGY CONSULTATION  [facility], [place]    Assessment & Plan   Puja Markham is a 48 year old female who presents with acute   pulmonary embolism.    #acute PE    Risk stratification:   Calculation of patient's Simplified Pulmonary Embolism Severity   Index (sPESI) score:1(history of cancer)  RV strain by RV:LV ratio: 1:1  RV enlargement and dysfunction by echo: Yes  LE Doppler findings: Pending  Biomarker data: Positive troponin 0.082  Vital signs: Heart rate 104, blood pressure 140/80, O2 sat 96% on   2 L cannula  Classification of PE (high-intermediate  submassive):       Given hemodynamic stability, we do not think we need invasive   intervention.  We agree with anticoagulation with heparin   followed by oral anticoagulation later.  We may need to follow-up echocardiogram to assess RV function.  Troponin he is likely from RV strain.  Please check troponin   serially as follow-up but it is unlikely ACS.      # moderate AI, severely calcified AoV likely from radiation   - needs outpatient cardiology echo follow up     # Hodgikin's lymphoma history at age of 15    The results and significance of today's testing was discussed   with the patient in lay terms. More the 50% of time was for   counseling and coordinating care.    Thank you for allowing us to participate in the care of your   patient. Please do not hesitate to contact the cardiology consult   service if you have any questions.     Cardiology will follow.    Patient discussed with Dr. Sakshi Rodríguez MD  Cardiology Fellow [phone]      History of Present Illness   Chief complaint:MARTA Markham is a 48 year old female with a history of GERD   and Hodgkin's lymphoma at age of 15 who presents to the Emergency   Department today for evaluation of shortness of breath. The   patient states that she has been having increased shortness of   breath the past few days.   Until this happened, she was physically very active.  She walks   45 minutes every day without short of breath.    No chest pain.  No wheezing.  No swelling in her legs.  No   bleeding or thrombosis history.  No family history of thrombosis   or bleeding.  She has 2 children.  Non-smoker.  No hormonal   treatment.  No history of miscarriage.    In the emergency department, CT chest showed pulmonary embolism.    PE rapid response team was activated.    CT  IMPRESSION:   1. Acute extensive pulmonary emboli extending the from the distal  right and left pulmonary arteries into all the lobar arteries and  multiple segmental branches.  2. No  CT evidence of right heart strain.  3. Subtly triangular nodule in the left costophrenic sulcus,   possibly  pulmonary infarction. Differential diagnosis includes round  atelectasis and aspiration.          EKG  Sinus tachycardia, LBBB, LAD    Echocardiogram:  Mild RV strain.  Mild LV dilation.  LV systolic function normal.  Mild TR.  TR pressure gradient was 42 mmHg.  Calcified aortic valve with moderate AI.  Dilated IVC.  Atrium was not evaluated well.        Past Medical History:   Diagnosis Date     Hodgkin disease (H) 1986     Seizures (H)     d/t epidrual.      Past Surgical History:   Procedure Laterality Date     ENDOMETRIAL CRYOABLATION W/ ULTRASONIC GUIDANCE  2011     HYSTERECTOMY RADICAL  2017     LASIK  04/2017     Social History     Socioeconomic History     Marital status:      Spouse name: Not on file     Number of children: Not on file     Years of education: Not on file     Highest education level: Not on file   Occupational History     Not on file   Social Needs     Financial resource strain: Not on file     Food insecurity:     Worry: Not on file     Inability: Not on file     Transportation needs:     Medical: Not on file     Non-medical: Not on file   Tobacco Use     Smoking status: Current Some Day Smoker     Smokeless tobacco: Never Used     Tobacco comment: One pack in 1.5 months   Substance and Sexual Activity     Alcohol use: Yes     Comment: on the weekends.      Drug use: Never     Sexual activity: Yes     Partners: Male   Lifestyle     Physical activity:     Days per week: Not on file     Minutes per session: Not on file     Stress: Not on file   Relationships     Social connections:     Talks on phone: Not on file     Gets together: Not on file     Attends [employer] service: Not on file     Active member of club or organization: Not on file     Attends meetings of clubs or organizations: Not on file     Relationship status: Not on file     Intimate partner violence:     Fear of  current or ex partner: Not on file     Emotionally abused: Not on file     Physically abused: Not on file     Forced sexual activity: Not on file   Other Topics Concern     Not on file   Social History Narrative     Not on file       History reviewed. No pertinent family history.  Non contributory  Prior to Admission Medications   Prior to Admission Medications   Prescriptions Last Dose Informant Patient Reported? Taking?   NELLY SEED PO 7/17/2019 at Unknown time  Yes Yes   Milk Thistle-Dand-Fennel-Licor (MILK THISTLE XTRA) CAPS capsule   7/17/2019 at Unknown time  Yes Yes   Sig: Take 175 mg by mouth   levothyroxine (SYNTHROID/LEVOTHROID) 75 MCG tablet 7/17/2019 at   Unknown time  Yes Yes   Sig: Take 75 mcg by mouth daily   magnesium sulfate (EPSOM SALT) GRAN granules Past Week at Unknown   time  Yes Yes   Sig: Apply topically 2 times daily as needed for skin care   naproxen (NAPROSYN) 250 MG tablet 7/17/2019 at Unknown time  Yes   Yes   Sig: Take 250 mg by mouth 2 times daily (with meals)   omeprazole (PRILOSEC OTC) 20 MG EC tablet 7/17/2019 at Unknown   time  Yes Yes   Sig: Take 40 mg by mouth daily   vitamin B6 (VITAMIN  B-6) 25 MG tablet 7/17/2019 at Unknown time    Yes Yes   Sig: Take 25 mg by mouth daily      Facility-Administered Medications: None       No current facility-administered medications on file prior to   encounter.   Current Outpatient Medications on File Prior to Encounter:  NELLY SEED PO    levothyroxine (SYNTHROID/LEVOTHROID) 75 MCG tablet Take 75 mcg by   mouth daily   magnesium sulfate (EPSOM SALT) GRAN granules Apply topically 2   times daily as needed for skin care   Milk Thistle-Dand-Fennel-Licor (MILK THISTLE XTRA) CAPS capsule   Take 175 mg by mouth   naproxen (NAPROSYN) 250 MG tablet Take 250 mg by mouth 2 times   daily (with meals)   omeprazole (PRILOSEC OTC) 20 MG EC tablet Take 40 mg by mouth   daily   vitamin B6 (VITAMIN  B-6) 25 MG tablet Take 25 mg by mouth daily     Allergies   No  Known Allergies  Review of Systems   10-point ROS reviewed & found negative w/ exceptions noted in the   HPI.    Physical Exam   Temp: 97.4  F (36.3  C) Temp src: Oral BP: 143/82   Heart Rate:   104 Resp: 20 SpO2: 96 % O2 Device: Nasal cannula Oxygen Delivery:   2 LPM    GEN:  Alert, oriented x 3, appears comfortable, NAD.  HEENT:  Normocephalic/atraumatic, no scleral icterus, no nasal   discharge, mouth moist.  CV:  Heart is with regular rate/rhythm. No murmurs,   No rubs. Normal S1 and S2.  No S3, No S4   Positive JVD with 10cm H2O in [place] venous pressure.  Jugular venus pressure is with decline in inspiration. No   Kussmaul's sign.  Peripheral arteries:RA, FA, DP, PT all 2+/2+  no bruit on carotid arteries bilaterally, normal uptake of the   both carotid arteries.  No bruit on abdomen or upper chest.  No lower extremities edema2+/2+  LUNGS:  Clear to auscultation bilaterally   ABD:  Active bowel sounds, soft, non-tender/non-distended.  No   rebound/guarding/rigidity.  EXT:  No edema or cyanosis.  Hands/feet warm to touch with good   signs of peripheral perfusion.   SKIN:  Dry to touch, no exanthems noted in the visualized areas.  NEURO:  No new focal deficits appreciated.    Imaging Study  Data              Labs  Data       Metabolic Studies  Recent Labs   Lab 07/17/19 2033      POTASSIUM 4.7   CHLORIDE 108   CO2 24   ANIONGAP 8   *   BUN 20   CR 0.76   GFRESTIMATED >90   GFRESTBLACK >90   JAMES 9.6   PROTTOTAL 8.2   ALBUMIN 4.0   BILITOTAL 0.3   ALKPHOS 146   *   ALT 75*     Hematology  Recent Labs   Lab 07/17/19 2033   WBC 12.8*   RBC 4.60   HGB 14.2   HCT 43.9   MCV 95   MCH 30.9   MCHC 32.3   RDW 13.6        INR  Recent Labs   Lab 07/17/19 2033   INR 1.02       Troponin  Recent Labs   Lab 07/17/19 2033   TROPI 0.082*              Physical Exam     Abram Rodríguez MD  Cardiology Fellow [phone]                          Assessments & Plan (with Medical Decision Making)  48-year-old  female history of Hodgkin's lipoma many years ago in remission presents now with 5 days of increasing dyspnea on exertion.  Patient evaluated here in the ER d-dimer was elevated to 3.4.  CT scan shows diffuse bilateral pulmonary emboli seen throughout the lobar and segmental pulmonary arteries.  Questionable left lower infarct seen.  No septal changes seen on CT.  Activation of the PERT.  Patient seen by cards fellow down here in the ER reviewed echo showing some questionable mild right heart strain but no sign of any decompensation etc.  Patient otherwise stable vital stable etc. is on O2.  Initiated heparin high intensity with load right away after the CT findings were noted.  Patient to be admitted to the medicine service and monitored bed.  Slight elevation of troponin at 0.08 along with elevation of BNP noted at 5700.  Bilateral lower extremity Doppler studies have been ordered also pending.           I have reviewed the nursing notes.    I have reviewed the findings, diagnosis, plan and need for follow up with the patient.       Medication List      There are no discharge medications for this visit.       Final diagnoses:   Bilateral pulmonary embolism (H)   STEVEN (dyspnea on exertion)   Elevated troponin   Elevated brain natriuretic peptide (BNP) level   ICameron, am serving as a trained medical scribe to document services personally performed by Earl Arevalo MD, based on the provider's statements to me.   IEarl MD, was physically present and have reviewed and verified the accuracy of this note documented by Cameron Mckeon.     7/17/2019   [facility], EMERGENCY DEPARTMENT     Earl Arevalo MD  07/18/19 0212

## 2019-07-18 NOTE — PLAN OF CARE
Admission/Transfer from: ED  2 RN skin assessment completed by: Cinthya Lozano and Trupti Kim.    No acute findings, minor bruising and left PIV in place.

## 2019-07-18 NOTE — PROGRESS NOTES
"Cardiology Progress Note  July 18, 2019    This patient was discussed with Dr. Kenny and the note below reflects our joint plan.   Assessment and Recommendation:   Puja Markham is a 48 year old female with a history of Hodgkin Lymphoma in remission (1986), hypothyroidism and GERD who presented to the hospital on 7/17/2019 with acute pulmonary embolism.      1. Acute pulmonary embolism  High-intermediate submassive PE and no signs of hemodynamic instability. Troponin has peaked 0.082=>0.070 likely due to RV strain from submassive PE and less likely ischemia.   - Continue with anticoagulation with high intensity heparin gtt for at least 24 hours then transition to DOAC such as apixaban   - No need for invasive intervention per discussion with Dr. Cantor  - Start hypercoagulable lab work up   - Follow up with cardiology outpatient, can consider cardiac stress testing as an outpatient      2. Moderate Aortic insufficiency, likely related to previous history of radiation   - Follow up with Cardiology as outpatient     3. Hodgkin's Lymphoma  - Has been in remission since 1986    Thank you for consulting the cardiovascular services at the [facility]. Please do not hesitate to call us with any questions.     Kari Rodriguez DO  [facility] Family Medicine PGY3  [facility] Cardiology Consult Team  Pager [phone] or [phone]    Interval History: No palpitations, CP or other cardiac symptoms. Has not had any shortness of breath during ambulation to bathroom and at rest. Remains on 2L oxygen.     Review of systems: 4 point ROS including Respiratory, CV, GI and , other than that noted in the HPI,  is negative     Objective:   Vitals: /76 (BP Location: Right arm)   Temp 97  F (36.1  C) (Oral)   Resp 16   Ht 1.6 m (5' 3\")   Wt 59.9 kg (132 lb 1.6 oz)   SpO2 97%   BMI 23.40 kg/m     No intake or output data in the 24 hours ending 07/18/19 0908  Gen: AxO, NAD, sitting " comfortably in bed  Lungs: No increased work of breathing, CTAB, on 2L O2  CV: +S1S2,Reg, no M/R/G noted. JVD present.   Abd: Soft, non-tender, non-distended   Ext: No pedal edema  Labs:  Lab Results   Component Value Date    TROPI 0.070 (H) 07/18/2019    TROPI 0.082 (H) 07/17/2019     Diagnostic studies:     Echo: Mild to moderate right ventricular dilation is present. Global right ventricular function is mildly reduced. Calcified AoV with moderate AI. Valve gradient was not assessed. Mild TR. RVSP is 46mmHg above the right atrial pressure ~around 61 mmHg. Moderate PH. Dilation of IVC. Those findings are compatible with acute PE with RV strain.   Tele: New onset left bundle branch block   EKG: Sinus rhythm, left axis deviation, left bundle branch block has now replaced non-specific intra-articular block, prolonged QTc 528msec  Meds per EPIC EMR:    levothyroxine  75 mcg Oral Daily     omeprazole  40 mg Oral Daily     sodium chloride (PF)  3 mL Intracatheter Q8H

## 2019-07-18 NOTE — CONSULTS
CARDIOLOGY CONSULTATION  [facility], [place]    Assessment & Plan   Puja Markham is a 48 year old female who presents with acute pulmonary embolism.    #acute PE    Risk stratification:   Calculation of patient's Simplified Pulmonary Embolism Severity Index (sPESI) score:1(history of cancer)  RV strain by RV:LV ratio: 1:1  RV enlargement and dysfunction by echo: Yes  LE Doppler findings: Pending  Biomarker data: Positive troponin 0.082  Vital signs: Heart rate 104, blood pressure 140/80, O2 sat 96% on 2 L cannula  Classification of PE (high-intermediate submassive):       Given hemodynamic stability, we do not think we need invasive intervention.  We agree with anticoagulation with heparin followed by oral anticoagulation later.  We may need to follow-up echocardiogram to assess RV function.  Troponin he is likely from RV strain.  Please check troponin serially as follow-up but it is unlikely ACS.      # moderate AI, severely calcified AoV likely from radiation   - needs outpatient cardiology echo follow up     # Hodgikin's lymphoma history at age of 15    The results and significance of today's testing was discussed with the patient in lay terms. More the 50% of time was for counseling and coordinating care.    Thank you for allowing us to participate in the care of your patient. Please do not hesitate to contact the cardiology consult service if you have any questions.     Cardiology will follow.    Patient discussed with Dr. Sakshi Rodríguez MD  Cardiology Fellow [phone]      History of Present Illness   Chief complaint:MARTA Markham is a 48 year old female with a history of GERD and Hodgkin's lymphoma at age of 15 who presents to the Emergency Department today for evaluation of shortness of breath. The patient states that she has been having increased shortness of breath the past few days.   Until this happened, she was physically very active.  She walks 45  minutes every day without short of breath.    No chest pain.  No wheezing.  No swelling in her legs.  No bleeding or thrombosis history.  No family history of thrombosis or bleeding.  She has 2 children.  Non-smoker.  No hormonal treatment.  No history of miscarriage.    In the emergency department, CT chest showed pulmonary embolism.  PE rapid response team was activated.    CT  IMPRESSION:   1. Acute extensive pulmonary emboli extending the from the distal  right and left pulmonary arteries into all the lobar arteries and  multiple segmental branches.  2. No CT evidence of right heart strain.  3. Subtly triangular nodule in the left costophrenic sulcus, possibly  pulmonary infarction. Differential diagnosis includes round  atelectasis and aspiration.          EKG  Sinus tachycardia, LBBB, LAD    Echocardiogram:  Mild RV strain.  Mild LV dilation.  LV systolic function normal.  Mild TR.  TR pressure gradient was 42 mmHg.  Calcified aortic valve with moderate AI.  Dilated IVC.  Atrium was not evaluated well.        Past Medical History:   Diagnosis Date     Hodgkin disease (H) 1986     Seizures (H)     d/t epidrual.      Past Surgical History:   Procedure Laterality Date     ENDOMETRIAL CRYOABLATION W/ ULTRASONIC GUIDANCE  2011     HYSTERECTOMY RADICAL  2017     LASIK  04/2017     Social History     Socioeconomic History     Marital status:      Spouse name: Not on file     Number of children: Not on file     Years of education: Not on file     Highest education level: Not on file   Occupational History     Not on file   Social Needs     Financial resource strain: Not on file     Food insecurity:     Worry: Not on file     Inability: Not on file     Transportation needs:     Medical: Not on file     Non-medical: Not on file   Tobacco Use     Smoking status: Current Some Day Smoker     Smokeless tobacco: Never Used     Tobacco comment: One pack in 1.5 months   Substance and Sexual Activity     Alcohol use:  Yes     Comment: on the weekends.      Drug use: Never     Sexual activity: Yes     Partners: Male   Lifestyle     Physical activity:     Days per week: Not on file     Minutes per session: Not on file     Stress: Not on file   Relationships     Social connections:     Talks on phone: Not on file     Gets together: Not on file     Attends Taoism service: Not on file     Active member of club or organization: Not on file     Attends meetings of clubs or organizations: Not on file     Relationship status: Not on file     Intimate partner violence:     Fear of current or ex partner: Not on file     Emotionally abused: Not on file     Physically abused: Not on file     Forced sexual activity: Not on file   Other Topics Concern     Not on file   Social History Narrative     Not on file       History reviewed. No pertinent family history.  Non contributory  Prior to Admission Medications   Prior to Admission Medications   Prescriptions Last Dose Informant Patient Reported? Taking?   NELLY SEED PO 7/17/2019 at Unknown time  Yes Yes   Milk Thistle-Dand-Fennel-Licor (MILK THISTLE XTRA) CAPS capsule 7/17/2019 at Unknown time  Yes Yes   Sig: Take 175 mg by mouth   levothyroxine (SYNTHROID/LEVOTHROID) 75 MCG tablet 7/17/2019 at Unknown time  Yes Yes   Sig: Take 75 mcg by mouth daily   magnesium sulfate (EPSOM SALT) GRAN granules Past Week at Unknown time  Yes Yes   Sig: Apply topically 2 times daily as needed for skin care   naproxen (NAPROSYN) 250 MG tablet 7/17/2019 at Unknown time  Yes Yes   Sig: Take 250 mg by mouth 2 times daily (with meals)   omeprazole (PRILOSEC OTC) 20 MG EC tablet 7/17/2019 at Unknown time  Yes Yes   Sig: Take 40 mg by mouth daily   vitamin B6 (VITAMIN  B-6) 25 MG tablet 7/17/2019 at Unknown time  Yes Yes   Sig: Take 25 mg by mouth daily      Facility-Administered Medications: None       No current facility-administered medications on file prior to encounter.   Current Outpatient Medications on File  Prior to Encounter:  NELLY SEED PO    levothyroxine (SYNTHROID/LEVOTHROID) 75 MCG tablet Take 75 mcg by mouth daily   magnesium sulfate (EPSOM SALT) GRAN granules Apply topically 2 times daily as needed for skin care   Milk Thistle-Dand-Fennel-Licor (MILK THISTLE XTRA) CAPS capsule Take 175 mg by mouth   naproxen (NAPROSYN) 250 MG tablet Take 250 mg by mouth 2 times daily (with meals)   omeprazole (PRILOSEC OTC) 20 MG EC tablet Take 40 mg by mouth daily   vitamin B6 (VITAMIN  B-6) 25 MG tablet Take 25 mg by mouth daily     Allergies   No Known Allergies  Review of Systems   10-point ROS reviewed & found negative w/ exceptions noted in the HPI.    Physical Exam   Temp: 97.4  F (36.3  C) Temp src: Oral BP: 143/82   Heart Rate: 104 Resp: 20 SpO2: 96 % O2 Device: Nasal cannula Oxygen Delivery: 2 LPM    GEN:  Alert, oriented x 3, appears comfortable, NAD.  HEENT:  Normocephalic/atraumatic, no scleral icterus, no nasal discharge, mouth moist.  CV:  Heart is with regular rate/rhythm. No murmurs,   No rubs. Normal S1 and S2.  No S3, No S4   Positive JVD with 10cm H2O in Juglar venous pressure.  Jugular venus pressure is with decline in inspiration. No Kussmaul's sign.  Peripheral arteries:RA, FA, DP, PT all 2+/2+  no bruit on carotid arteries bilaterally, normal uptake of the both carotid arteries.  No bruit on abdomen or upper chest.  No lower extremities edema2+/2+  LUNGS:  Clear to auscultation bilaterally   ABD:  Active bowel sounds, soft, non-tender/non-distended.  No rebound/guarding/rigidity.  EXT:  No edema or cyanosis.  Hands/feet warm to touch with good signs of peripheral perfusion.   SKIN:  Dry to touch, no exanthems noted in the visualized areas.  NEURO:  No new focal deficits appreciated.    Imaging Study  Data              Labs  Data       Metabolic Studies  Recent Labs   Lab 07/17/19  2033      POTASSIUM 4.7   CHLORIDE 108   CO2 24   ANIONGAP 8   *   BUN 20   CR 0.76   GFRESTIMATED >90    GFRESTBLACK >90   JAMES 9.6   PROTTOTAL 8.2   ALBUMIN 4.0   BILITOTAL 0.3   ALKPHOS 146   *   ALT 75*     Hematology  Recent Labs   Lab 07/17/19 2033   WBC 12.8*   RBC 4.60   HGB 14.2   HCT 43.9   MCV 95   MCH 30.9   MCHC 32.3   RDW 13.6        INR  Recent Labs   Lab 07/17/19 2033   INR 1.02       Troponin  Recent Labs   Lab 07/17/19 2033   TROPI 0.082*              Physical Exam     Abram Rodríguez MD  Cardiology Fellow [phone]

## 2019-07-18 NOTE — IR NOTE
Called by emergency medicine physician Dr. Arevalo regarding pulmonary embolism.  The CT demonstrates thrombus within the segmental and subsegmental arterial branches.  No CT evidence of right heart strain.  Minimal troponin leak above laboratory reference range value.  No acute IR intervention indicated at this time for catheter-directed therapies.  The patient is borderline tachycardic at 100.  No evidence of hypotension.      -Recommend cardiology consultation.  -Echocardiogram recommended to further investigate for more subtle findings of right heart dysfunction.    -Systemic anticoagulation recommended.  -IR will continue to follow along.  -Discussed with staff IR Dr. Irvin.

## 2019-07-18 NOTE — PROGRESS NOTES
Resident/Fellow Attestation   I, Simran Gautma, was present with the medical student who participated in the service and in the documentation of the note.  I have verified the history and personally performed the physical exam and medical decision making.  I agree with the assessment and plan of care as documented in the note.      Simran Gautam MD  PGY2  Date of Service (when I saw the patient): 07/18/19    [facility], [place]    Progress Note - [facility]ie's Service        Date of Admission:  7/17/2019    Assessment & Plan   Puja Markham is a 48 year old female admitted on 7/17/2019. She has a history of Hodgkin Lymphoma at age 15, hypothyroidism, & GERD and is admitted for pulmonary embolism.     # Acute pulmonary embolism  # New Left BBB  Patient with several days of dyspnea with exertion in setting of recent long travel was found to have extensive bilateral pulmonary emboli (from the right and left pulmonary arteries into the segmental branches) on CT. Work up ruled out ACS, however, left BBB on EKG, elevated BNPelevated and echo on 7/18 showed (1) RV mild-moderate dilation, (2) RV mild reduced function, (3) moderate PH, (4) elevated right side pressure, and (5) moderate AI. Negative LE Doppler. She remains hemodynamically stable but requiring 2 LPM O2; no intervention after being evaluated by cardiology and IR. Patient has no prior PE/DVT, no known familial coagulopathy, and recent travel history, thus suspect provoked PE. Would consider outpatient cancer screen & coagulopathy workup as unlikely to be helpful in setting of acute clot.  - Cardiology following; recommendations appreciated  - Continue heparin drip for at least 24 hrs total; then transition to PO apixaban  - Wean off oxygen as able  - Follow-up with cariology outpatient, may consider stress testing & cardiology  - PCP to f/u malignancy and hypercoagulation work up   - Telemetry monitoring and vitals every 4     #  Hypothyroidism   Continue home levothyroxine 75mcg PO daily     # GERD  # Hx of Marcum's confirmed with endoscopy  Continue home omeprazole 40mg PO daily    # Hx of Hodgkin Lymphoma stage IIIa in remission since 1986 s/p radiation  Patient reports Hodgkin Lymphoma diagnosed at stage IIIa that was treated with 5 weeks of neck & 4 weeks of abdominal radiation; also noted 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 Reported history of normal breast US & mammogram in 2017. 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 Also noted laparoscopic hysterectomy for uterine bleeding after inadequate symptomatic relief from endometrial ablation in 2011; no known malignancy.  - Encourage patient to schedule outpatient mammogram & EGD for Cristopher's  - Consider outpatient thyroid US or TSH in setting of history of neck radiation       Diet: Combination Diet Regular Diet Adult    Fluids: Regular diet  Lines: 1 PIV on left arm  DVT Prophylaxis: Heparin drip  Kemp Catheter: not present  Code Status: Full Code      Disposition Plan   Expected discharge: 2 - 3 days, recommended to prior living arrangement once O2 use less than  liters/minute and transitioned to oral anticoagulation & clinically stable.  Entered: Hardik Stockton 07/18/2019, 9:01 AM       The patient's care was discussed with the Attending Physician, Dr. Mir.    Hardik Stockton  Medical Student  [facility], [place]  Pager: [phone]  Please see sticky note for cross cover information  ______________________________________________________________________    Interval History   - No acute overnight events  - Difficulty sleeping  - No chest pain, dyspnea on NC, diaphoresis, or abnormal sensations in arm/back/jaw  - Reported concern regarding recent URI  - No fever, chills, nausea, vomiting, or diarrhea    Data reviewed today: I reviewed all medications, new labs and  imaging results over the last 24 hours.    Physical Exam   Vital Signs: Temp: 97  F (36.1  C) Temp src: Oral BP: 132/76   Heart Rate: 86 Resp: 16 SpO2: 97 % O2 Device: None (Room air) Oxygen Delivery: 2 LPM  Weight: 132 lbs 1.6 oz  Constitutional: awake, alert, cooperative, no apparent distress  ENT: Anodular, non-enlarged thyroid  Respiratory: Breathing comfortably on 2LPM, good air exchange, clear to auscultation bilaterally, no crackles or wheezing  Cardiovascular: Tachycardic, normal S1 and S2, with S3 gallop  GI: Soft, non-distended, non-tender, no masses palpated  Skin: no bruising or bleeding and no redness, warmth, or swelling  Musculoskeletal: no lower extremity pitting edema present; no tenderness of calves  Neuropsychiatric: General: normal, calm and normal eye contact  Level of consciousness: alert / normal  Affect: normal and pleasant    Data   Recent Labs   Lab 07/18/19  0604 07/17/19  2033   WBC  --  12.8*   HGB  --  14.2   MCV  --  95   PLT  --  280   INR  --  1.02    139   POTASSIUM 4.2 4.7   CHLORIDE 109 108   CO2 21 24   BUN 17 20   CR 0.74 0.76   ANIONGAP 8 8   JAMES 8.8 9.6   * 106*   ALBUMIN 3.5 4.0   PROTTOTAL 7.0 8.2   BILITOTAL 0.4 0.3   ALKPHOS 140 146   ALT 73* 75*   * 123*   TROPI 0.070* 0.082*     Recent Results (from the past 24 hour(s))   CT Chest Pulmonary Embolism w Contrast   Result Value    Radiologist flags Acute pulmonary emboli (Urgent)    Narrative    Examination:  CT CHEST PULMONARY EMBOLISM W CONTRAST 7/17/2019 9:31 PM      Comparison: None available.    PROVIDED HISTORY: PE suspected, high pretest probability    TECHNIQUE: Volumetric helical acquisition of CT images of the chest  from the lung apices to the kidneys were acquired in arterial phase  after the administration of IV contrast. Three-dimensional (3D)  post-processed angiographic images were reconstructed, archived to  PACS and used in the interpretation of this study. Contrast dose:  iopamidol  (ISOVUE-370) solution 53 mL    FINDINGS:  VASCULATURE: There is adequate opacification of the main and lobar  pulmonary arteries. Acute pulmonary emboli in the distal right  pulmonary artery extending into the right upper, middle, and lower  lobar and segmental arteries, as well as the distal left main  pulmonary artery descending into the left upper and lower lobar and  segmental arteries. No right heart strain. Heart size is within normal  limits. Normal caliber of the thoracic aorta and main pulmonary  artery. No pericardial effusion.    LUNGS: The central tracheobronchial tree is patent. No pleural  effusion or pneumothorax. Right upper lobe paramediastinal  architectural distortion with slight bronchiectasis and reticulation  suggesting atelectasis. Mild dependent atelectasis bilaterally.  Vaguely triangular 1.2 x 1.4 cm nodular opacity in the left  costophrenic sulcus (series 9, image 81).    MEDIASTINUM: No enlarged mediastinal or axillary lymph nodes. Thyroid  is unremarkable. Mildly patulous esophagus.    UPPER ABDOMEN: Incidental low-density left renal cortical cyst.    BONES/SOFT TISSUES: No aggressive osseous lesions.      Impression    IMPRESSION:   1. Acute extensive pulmonary emboli extending the from the distal  right and left pulmonary arteries into all the lobar arteries and  multiple segmental branches.  2. No CT evidence of right heart strain.  3. Subtly triangular nodule in the left costophrenic sulcus, possibly  pulmonary infarction. Consider short term follow-up.    [Urgent Result: Acute pulmonary emboli]    Finding was identified on 7/17/2019 9:55 PM.     Dr. Arevalo was contacted by Dr. Dubon at 7/17/2019 10:08 PM and  verbalized understanding of the urgent finding.     I have personally reviewed the examination and initial interpretation  and I agree with the findings.    FADUMO SANTOS MD   XR Chest 2 Views    Narrative    EXAM: XR CHEST 2 VW  7/17/2019 9:53 PM      HISTORY:  shortness of breath    COMPARISON: CT chest 7/17/2019    FINDINGS: PA and lateral chest. The trachea is midline. The cardiac  silhouette is within normal limits. No pleural effusion or  pneumothorax. Upper abdomen is unremarkable. Degenerative changes of  the spine.       Impression    IMPRESSION: No acute airspace opacities.    I have personally reviewed the examination and initial interpretation  and I agree with the findings.    FADUMO SANTOS MD   US Lower Extremity Venous Duplex Bilateral    Narrative    EXAMINATION: DOPPLER VENOUS ULTRASOUND OF BILATERAL LOWER EXTREMITIES,  7/18/2019 1:03 AM     COMPARISON: None available    HISTORY: Evaluation for DVT     TECHNIQUE:  Gray-scale evaluation with compression, spectral flow and  color Doppler assessment of the deep venous system of both legs from  groin to knee, and then at the ankles.    FINDINGS:  In both lower extremities, the common femoral, femoral, popliteal and  posterior tibial veins demonstrate normal compressibility and blood  flow.      Impression    IMPRESSION:  No evidence of deep venous thrombosis in either lower extremity.    I have personally reviewed the examination and initial interpretation  and I agree with the findings.    ILIANA ARAYA MD     Medications     HEParin 950 Units/hr (07/18/19 0820)     - MEDICATION INSTRUCTIONS -         levothyroxine  75 mcg Oral Daily     omeprazole  40 mg Oral Daily     sodium chloride (PF)  3 mL Intracatheter Q8H

## 2019-07-18 NOTE — PLAN OF CARE
Hep 10A supratherapuetic this a.m. so held and rate decreased per order, Hep 10 A supra therapeutic this afternoon and held and dose decreased per order, due for hep 10 A draw at 2000 this [place]  VSS except tachycardic to 120 at times, did update medical team as this is increase from pts tachycardia overnight of 105, pt triggered sepsis due to elevated WBC and elevated HR, lactic 1.0, updated medical team  Alert, orientated, denies pain, able to wean off 2 liters oxygen to room air and sats mid 90's on room air at rest, when up ambulating on room air sats remains greater then 90 until back to room and sats decreased to 85 for a few minutes but pt did some deep breathing and sats recovered to low mid 90's within minutes staying on room air, does have some chest tightness when up walking that is not new.

## 2019-07-18 NOTE — PLAN OF CARE
Admission to unit 5A @ 0200 with bilateral PE. Pt alert and oriented, VSS on 2 L O2 via nasal cannula. Up independently in room and to bathroom, voiding spontaneously and no BM reported overnight. Regular diet and tolerating well. Tele in place reading NSR. Prolonged QRS noted, MD paged FYI. Continuous pulse ox in place. Heparin infusing @ 1100 units/hr, Hep 10A scheduled for this morning with AM labs. Troponin elevated, trending. BNP and D-Dimer also elevated. Lower extremity US showing no evidence of DVT. EKG normal. No plans for surgery at this point per MD note. At end of shift, noticed change in tele monitoring. MD paged that it looked like BBB. EKG ordered and completed, normal with Left BBB. MD stated no significant change so no other interventions ordered at this time. Continue with POC.

## 2019-07-18 NOTE — ED TRIAGE NOTES
Pt c/o SOB that started this past weekend. She was seen at  today and told to come to ED to get evaluated for PE. Pt desats with exertion to 88%, recovered well when sitting at 95% O2.No hx of PE or pneumo. Hx of hodgkin's disease.  Pt recently went on a trip, was sitting in the car for long hours. Pt got back on 7/6/19.

## 2019-07-18 NOTE — ED NOTES
"ED Triage Provider Note  [facility]  Encounter Date: Jul 17, 2019    History:  Chief Complaint   Patient presents with     Shortness of Breath     Puja Markham is a 48 year old female with remote h/o hodkin's disease who presents to the ED with shortness of breath.  Symptoms began 5 days ago and have become progressively worse particularly with activity.  Denies any chest pain, fevers, cough but does not nasal congestion.  Denies any leg pain or swelling.  No h/o DVT or PE but did recently drive to [place] for 6 hours each way in the car.      Review of Systems:  Constitutional: No fever  Cardiac: No chest pain  Pulm: Presence of dyspnea, no cough      Exam:  /83   Temp 98.3  F (36.8  C) (Oral)   Resp 20   Ht 1.6 m (5' 3\")   Wt 60.6 kg (133 lb 9.6 oz)   SpO2 94%   BMI 23.67 kg/m    General: No acute distress but visibly dyspneic. Appears stated age.   Cardio: sinus tachycardia, extremities well perfused, no LE swelling  Resp:Increased work of breathing, no wheezing, symmetric  Neuro: Alert. CN II-XII grossly intact. Grossly intact strength.       Medical Decision Making:  Patient arriving to the ED with problem as above. A medical screening exam was performed. Labs, ECG, CXR and CT PE protocol orders initiated from Triage. The patient will be immediately roomed due to low oxygen saturations and tachycardia, need for supplemental O2.    Estefani Ramos MD on 7/17/2019 at 8:40 PM     Estefani Ramos MD  07/17/19 2048    "

## 2019-07-18 NOTE — H&P
[facility], [facility] History and Physical - [facility]       Date of Admission:  7/17/2019    Chief Complaint   Shortness of breath    History is obtained from the patient    History of Present Illness   Puja Markham is a 48 year old female  who has a history of Hodgkin lymphoma at age 15 and is admitted for pulmonary embolism.  The patient reports that she initially started feeling short of breath on Saturday, 5 days ago.  She was still able to continue her normal activities and the shortness of breath was mild.  It was not associated with any chest pain.  On Tuesday, 2 days ago the patient then started feeling significantly more short of breath with activity.  She describes feeling okay at rest, but having significant difficulty going up the stairs or down the stairs requiring rest at the bottom.  She typically walks 4 miles a day at 3-1/2 mph and reports attempting to walk at 2 mph and having to stop after just a few seconds.  Again this was not associated with any chest pain, wheezing, cough.  She did feel subjectively chilled and warm but did not have any measured temperature.  The patient then presented to an urgent care and was immediately sent to the emergency department.  She does have a recent travel history driving 6 hours to [facility] over 4 July and returning.  She is stopped one time during the drive.  No family history or personal history of blood clots.  No lower extremity swelling.    ED course: On arrival the patient was noted to be tachycardic and hypoxic requiring 2 L by nasal cannula.  Lab work was significant for an elevated d-dimer at 3.4, and elevated BNP at 5749, elevated troponin at 0.082, mildly elevated ALT and AST, but otherwise unremarkable complete metabolic and CBC.  A CT PE study was done showing acute extensive pulmonary emboli from the distal right and left pulmonary arteries into all lobar arteries and multiple  segmental branches. EKG showed NSR. A lower extremity ultrasound was negative.  The patient was seen by the pulmonary embolism team consisting of interventional radiology and cardiology who did not feel invasive intervention was necessary.  The patient was started on a heparin drip and transferred to the floor.      Review of Systems   The 10 point Review of Systems is negative other than noted in the HPI or here.     Past Medical History    I have reviewed this patient's medical history and updated it with pertinent information if needed.   Past Medical History:   Diagnosis Date     Hodgkin disease (H) 1986     Seizures (H)     d/t epidrual.         Past Surgical History   I have reviewed this patient's surgical history and updated it with pertinent information if needed.  Past Surgical History:   Procedure Laterality Date     ENDOMETRIAL CRYOABLATION W/ ULTRASONIC GUIDANCE  2011     HYSTERECTOMY RADICAL  2017     LASIK  04/2017        Social History   Social History     Tobacco Use     Smoking status: Current Some Day Smoker     Smokeless tobacco: Never Used     Tobacco comment: One pack in 1.5 months   Substance Use Topics     Alcohol use: Yes     Comment: on the weekends.      Drug use: Never       Family History   I have reviewed this patient's family history and updated it with pertinent information if needed.   History reviewed. No pertinent family history.    Prior to Admission Medications   Prior to Admission Medications   Prescriptions Last Dose Informant Patient Reported? Taking?   NELLY SEED PO 7/17/2019 at Unknown time  Yes Yes   Milk Thistle-Dand-Fennel-Licor (MILK THISTLE XTRA) CAPS capsule 7/17/2019 at Unknown time  Yes Yes   Sig: Take 175 mg by mouth   levothyroxine (SYNTHROID/LEVOTHROID) 75 MCG tablet 7/17/2019 at Unknown time  Yes Yes   Sig: Take 75 mcg by mouth daily   magnesium sulfate (EPSOM SALT) GRAN granules Past Week at Unknown time  Yes Yes   Sig: Apply topically 2 times daily as needed for  skin care   naproxen (NAPROSYN) 250 MG tablet 7/17/2019 at Unknown time  Yes Yes   Sig: Take 250 mg by mouth 2 times daily (with meals)   omeprazole (PRILOSEC OTC) 20 MG EC tablet 7/17/2019 at Unknown time  Yes Yes   Sig: Take 40 mg by mouth daily   vitamin B6 (VITAMIN  B-6) 25 MG tablet 7/17/2019 at Unknown time  Yes Yes   Sig: Take 25 mg by mouth daily      Facility-Administered Medications: None     Allergies   No Known Allergies    Physical Exam   Vital Signs: Temp: 96.4  F (35.8  C) Temp src: Oral BP: 153/77   Heart Rate: 98 Resp: 18 SpO2: 94 % O2 Device: Nasal cannula Oxygen Delivery: 2 LPM  Weight: 133 lbs 9.6 oz    General Appearance: awake, alert, NAD, comfortable   Eyes: clear conjunctiva   HEENT: moist mucus membranes   Respiratory: clear to auscultation bilaterally, no rales, no rhonchi, no wheeze, non-labored breathing   Cardiovascular: regular rate and rhythm, no murmur, no brandie, no rub   GI: abdomen soft, non-tender, non-distended, no guarding, no rebound   Skin: warm, dry   Musculoskeletal: no peripheral edema   Neurologic: awake, alert, oriented x3, no focal deficits   Psychiatric: normal mood and affect     Assessment & Plan   Puja Markham is a 48 year old female admitted on 7/17/2019. She has a history of hodgkin lymphoma at age 15, hypothyroidism, and GERD and is admitted for pulmonary embolism.     # Acute PE with acute cor pulmonale  # Type 2 MI due to: PE   Patient has extensive bilateral pulmonary emboli from the right and left pulmonary arteries into the segmental branches. Labs significant for mild troponin elevation and BNP elevation, EKG NSR. Echo showed right heart strain. Labs and imaging consistent with acute cor pulmonale secondary to PE. She is hemodynamically stable but requiring 2 L of oxygen.  Has been seen and evaluated by cardiology and interventional radiology for possible invasive intervention but it was not felt to be necessary given her stability.  Lower extremity  Doppler was negative.  She does have a recent travel history making this a provoked pulmonary embolism.  This is her first pulmonary embolism with no personal or family history of blood clots. Would consider cancer screening due to hx of lymphoma.   -Continue heparin drip  - Eventual transition to oral anticoagulation  - Consider follow-up echocardiogram for right ventricle function  - Telemetry monitoring and vitals every 4  - Trend troponin to peak     # Hypothyroidism   Continue home levothyroxine     # GERD   Continue home omeprazole     # Pain Assessment:  Current Pain Score 7/17/2019   Patient currently in pain? denies   Pain score (0-10) 0   Puja decker pain level was assessed and she currently denies pain.      Diet: No diet orders on file  Fluids: PO  DVT Prophylaxis: on heparin drip  Code Status: Full Code    Disposition Plan   Expected discharge: 2 - 3 days; recommended to prior living arrangement once anticoagulation plan established .Dispo:          Entered: Jackson Lewis 07/18/2019, 1:58 AM   Information in the above section will display in the discharge planner report.    The patient was discussed with Dr. [facility]  PGY-3  [facility]   Pager: [phone]  Please see sticky note for cross cover information      Data

## 2019-07-19 ENCOUNTER — PATIENT OUTREACH (OUTPATIENT)
Dept: CARE COORDINATION | Facility: CLINIC | Age: 49
End: 2019-07-19

## 2019-07-19 ENCOUNTER — TELEPHONE (OUTPATIENT)
Dept: CARDIOLOGY | Facility: CLINIC | Age: 49
End: 2019-07-19
Payer: COMMERCIAL

## 2019-07-19 VITALS
OXYGEN SATURATION: 95 % | WEIGHT: 132.1 LBS | HEIGHT: 63 IN | DIASTOLIC BLOOD PRESSURE: 56 MMHG | BODY MASS INDEX: 23.41 KG/M2 | SYSTOLIC BLOOD PRESSURE: 99 MMHG | RESPIRATION RATE: 16 BRPM | TEMPERATURE: 96.9 F

## 2019-07-19 LAB
ALBUMIN SERPL-MCNC: 3.2 G/DL (ref 3.4–5)
ALP SERPL-CCNC: 117 U/L (ref 40–150)
ALT SERPL W P-5'-P-CCNC: 55 U/L (ref 0–50)
ANION GAP SERPL CALCULATED.3IONS-SCNC: 8 MMOL/L (ref 3–14)
AST SERPL W P-5'-P-CCNC: 47 U/L (ref 0–45)
BILIRUB SERPL-MCNC: 0.4 MG/DL (ref 0.2–1.3)
BUN SERPL-MCNC: 17 MG/DL (ref 7–30)
CALCIUM SERPL-MCNC: 9 MG/DL (ref 8.5–10.1)
CHLORIDE SERPL-SCNC: 109 MMOL/L (ref 94–109)
CO2 SERPL-SCNC: 23 MMOL/L (ref 20–32)
CREAT SERPL-MCNC: 0.7 MG/DL (ref 0.52–1.04)
ERYTHROCYTE [DISTWIDTH] IN BLOOD BY AUTOMATED COUNT: 13.6 % (ref 10–15)
GFR SERPL CREATININE-BSD FRML MDRD: >90 ML/MIN/{1.73_M2}
GLUCOSE SERPL-MCNC: 96 MG/DL (ref 70–99)
HCT VFR BLD AUTO: 38.6 % (ref 35–47)
HGB BLD-MCNC: 12.1 G/DL (ref 11.7–15.7)
LMWH PPP CHRO-ACNC: 0.38 IU/ML
MCH RBC QN AUTO: 30.5 PG (ref 26.5–33)
MCHC RBC AUTO-ENTMCNC: 31.3 G/DL (ref 31.5–36.5)
MCV RBC AUTO: 97 FL (ref 78–100)
PLATELET # BLD AUTO: 283 10E9/L (ref 150–450)
POTASSIUM SERPL-SCNC: 3.9 MMOL/L (ref 3.4–5.3)
PROT SERPL-MCNC: 6.7 G/DL (ref 6.8–8.8)
RBC # BLD AUTO: 3.97 10E12/L (ref 3.8–5.2)
SODIUM SERPL-SCNC: 139 MMOL/L (ref 133–144)
WBC # BLD AUTO: 9.2 10E9/L (ref 4–11)

## 2019-07-19 PROCEDURE — 25000132 ZZH RX MED GY IP 250 OP 250 PS 637: Performed by: FAMILY MEDICINE

## 2019-07-19 PROCEDURE — 85027 COMPLETE CBC AUTOMATED: CPT | Performed by: FAMILY MEDICINE

## 2019-07-19 PROCEDURE — 36415 COLL VENOUS BLD VENIPUNCTURE: CPT | Performed by: FAMILY MEDICINE

## 2019-07-19 PROCEDURE — 85520 HEPARIN ASSAY: CPT | Performed by: FAMILY MEDICINE

## 2019-07-19 PROCEDURE — 80053 COMPREHEN METABOLIC PANEL: CPT | Performed by: FAMILY MEDICINE

## 2019-07-19 RX ORDER — GUAIFENESIN/DEXTROMETHORPHAN 100-10MG/5
5 SYRUP ORAL EVERY 4 HOURS PRN
Status: DISCONTINUED | OUTPATIENT
Start: 2019-07-19 | End: 2019-07-19 | Stop reason: HOSPADM

## 2019-07-19 RX ADMIN — OMEPRAZOLE 40 MG: 20 CAPSULE, DELAYED RELEASE ORAL at 08:31

## 2019-07-19 RX ADMIN — APIXABAN 10 MG: 5 TABLET, FILM COATED ORAL at 09:18

## 2019-07-19 RX ADMIN — LEVOTHYROXINE SODIUM 75 MCG: 0.05 TABLET ORAL at 08:30

## 2019-07-19 ASSESSMENT — ACTIVITIES OF DAILY LIVING (ADL)
ADLS_ACUITY_SCORE: 13

## 2019-07-19 NOTE — PHARMACY-ADMISSION MEDICATION HISTORY
Admission medication history interview status for the 7/17/2019 admission is complete. See Epic admission navigator for allergy information, pharmacy, prior to admission medications and immunization status.     Medication history interview sources:  Patient, Care Everywhere    Changes made to PTA medication list (reason)  Added: None  Deleted: Theo seeds - patient states that she uses this on her salads sometimes, but does not use it regularly.   Changed: Omeprazole - changed from tablet to capsule form.     Additional medication history information (including reliability of information, actions taken by pharmacist):  Patient is unsure of her milk thistle dosage which is why it is left blank.       Prior to Admission medications    Medication Sig Last Dose Taking? Auth Provider   levothyroxine (SYNTHROID/LEVOTHROID) 75 MCG tablet Take 75 mcg by mouth daily 7/17/2019 at Unknown time Yes Reported, Patient   Milk Thistle-Dand-Fennel-Licor (MILK THISTLE XTRA) CAPS capsule Take by mouth daily 7/17/2019 at Unknown time Yes Unknown, Entered By History   naproxen (NAPROSYN) 250 MG tablet Take 250 mg by mouth daily 7/17/2019 at Unknown time Yes Unknown, Entered By History   omeprazole (PRILOSEC) 40 MG DR capsule Take 40 mg by mouth daily 7/17/2019 at Unknown time Yes Unknown, Entered By History   vitamin B6 (VITAMIN  B-6) 25 MG tablet Take 25 mg by mouth daily 7/17/2019 at Unknown time Yes Reported, Patient         Medication history completed by:   Katharina Hernández  PharmD IV Student

## 2019-07-19 NOTE — PLAN OF CARE
0700-11.25: VSS on room air, AOx4, up ab marry- steady on feet. Pt denies pain. LS clear, HR tachy. Hep gtt stopped and Eliquis given. Pt aware of plan of anticoagulation - Written information and education given on anticoag and risks of being on anticoag explained. Discharge order reviewed with pt- home medication picked up by pt. Son will provide transportation home.  Wheelchair used to be pt to lobby. All belongings gathered by pt, nothing in security. PIV removed without incident, cannula intact. Call light in reach, will continue with plan of car until discharge occurs.

## 2019-07-19 NOTE — PLAN OF CARE
Patient alert and oriented x4. Vitals stable except for tachycardic 100's. Denies chest pain. Hep 10A at 9:50 PM was 0.75. Held for 30 min and dose decreases per protocol.Due for  next hep 10A draw at 4:30 AM.  Hep 10A level was 0.38. No changes cont. Heparin infusion at 7.5 unit(s)/hr. Will start with Eliquis today

## 2019-07-19 NOTE — TELEPHONE ENCOUNTER
[facility] Call Center    Phone Message    May a detailed message be left on voicemail: yes    Reason for Call: Other: Pts hospital discharge instruction state to follow up with Dr Cantor within 1 month- currently she is booked out to mid October for appointments. Please call pt to discuss, thanks!     Action Taken: Message routed to:  [facility] ([facility]): Cardiology

## 2019-07-22 DIAGNOSIS — I51.7 MILD PULMONIC REGURGITATION AND RV DILATION BY PRIOR ECHOCARDIOGRAM: ICD-10-CM

## 2019-07-22 DIAGNOSIS — I37.1 MILD PULMONIC REGURGITATION AND RV DILATION BY PRIOR ECHOCARDIOGRAM: ICD-10-CM

## 2019-07-22 DIAGNOSIS — I26.99 PULMONARY EMBOLISM (H): Primary | ICD-10-CM

## 2019-08-07 ENCOUNTER — TELEPHONE (OUTPATIENT)
Dept: CARDIOLOGY | Facility: CLINIC | Age: 49
End: 2019-08-07

## 2019-08-07 NOTE — TELEPHONE ENCOUNTER
"Spoke with patient by phone.  She is doing \"great\" post discharge.  She has been on the treadmill daily for the last 5 days and feels \"great.\"  I discussed with her the option to move up her appointment with Dr. Cantor to 8/27/19.  Per protocol patients are recommended for 30 day follow up.  Patient was discharged from the hospital on 7/20/19.  Patient saw a cardiologist closer to her home that is more convenient for her.  She is scheduled for an echo through them.  At this time we will cancel the echo and [place] [facility] [place]   The patient states understanding and agrees to call with any further questions or concerns.  "